# Patient Record
Sex: FEMALE | Race: WHITE | NOT HISPANIC OR LATINO | Employment: OTHER | URBAN - METROPOLITAN AREA
[De-identification: names, ages, dates, MRNs, and addresses within clinical notes are randomized per-mention and may not be internally consistent; named-entity substitution may affect disease eponyms.]

---

## 2023-01-11 LAB — HBA1C MFR BLD: 5.6 % (ref 4.8–5.6)

## 2023-06-14 LAB — TSH SERPL-ACNC: 4.08 UIU/ML (ref 0.45–4.5)

## 2023-10-13 LAB
CREATININE (EXTERNAL): 0.72 MG/DL (ref 0.57–1)
GFR ESTIMATED (EXTERNAL): 89 ML/MIN/1.73
GLUCOSE (EXTERNAL): 95 MG/DL (ref 70–99)
POTASSIUM (EXTERNAL): 4.4 MMOL/L (ref 3.5–5.2)

## 2023-10-30 ENCOUNTER — TRANSFERRED RECORDS (OUTPATIENT)
Dept: HEALTH INFORMATION MANAGEMENT | Facility: CLINIC | Age: 73
End: 2023-10-30

## 2023-11-06 ENCOUNTER — MEDICAL CORRESPONDENCE (OUTPATIENT)
Dept: HEALTH INFORMATION MANAGEMENT | Facility: CLINIC | Age: 73
End: 2023-11-06
Payer: COMMERCIAL

## 2023-11-06 ENCOUNTER — TRANSFERRED RECORDS (OUTPATIENT)
Dept: HEALTH INFORMATION MANAGEMENT | Facility: CLINIC | Age: 73
End: 2023-11-06
Payer: COMMERCIAL

## 2023-11-07 ENCOUNTER — TRANSCRIBE ORDERS (OUTPATIENT)
Dept: OTHER | Age: 73
End: 2023-11-07

## 2023-11-07 DIAGNOSIS — E21.3 HYPERPARATHYROIDISM (H): Primary | ICD-10-CM

## 2023-11-09 ENCOUNTER — TELEPHONE (OUTPATIENT)
Dept: ENDOCRINOLOGY | Facility: CLINIC | Age: 73
End: 2023-11-09
Payer: COMMERCIAL

## 2023-11-09 NOTE — TELEPHONE ENCOUNTER
[facility] Call Center    Phone Message    May a detailed message be left on voicemail: yes     Reason for Call: Appointment Intake    Referring Provider Name: Deja Ruth DO  [address]  [place] [address]  Phone: [phone]  Fax: [phone]    Diagnosis and/or Symptoms: Hyperparathyroidism (H24) [E21.3]   Priority - within 2 weeks.  Patient will be out of town until after the holidays starting on 11/10/23 - will be returning mid Decemberr and then leaving again around 1/10/24 and will be gone for a cpl months  Scheduled with Dr Payne, in [facility] on 6/3/24  Patient wants only in-person and prefers [facility]       Action Taken: Other: endo    Travel Screening: Not Applicable

## 2024-05-29 PROBLEM — Z96.652 S/P TOTAL KNEE ARTHROPLASTY, LEFT: Status: ACTIVE | Noted: 2022-09-26

## 2024-05-29 PROBLEM — M17.12 ARTHRITIS OF KNEE, LEFT: Status: ACTIVE | Noted: 2022-09-28
